# Patient Record
Sex: FEMALE | Race: WHITE | NOT HISPANIC OR LATINO
[De-identification: names, ages, dates, MRNs, and addresses within clinical notes are randomized per-mention and may not be internally consistent; named-entity substitution may affect disease eponyms.]

---

## 2017-05-19 ENCOUNTER — TRANSCRIPTION ENCOUNTER (OUTPATIENT)
Age: 62
End: 2017-05-19

## 2024-01-13 DIAGNOSIS — M25.561 PAIN IN RIGHT KNEE: ICD-10-CM

## 2024-01-13 DIAGNOSIS — M25.562 PAIN IN RIGHT KNEE: ICD-10-CM

## 2024-01-13 PROBLEM — Z00.00 ENCOUNTER FOR PREVENTIVE HEALTH EXAMINATION: Status: ACTIVE | Noted: 2024-01-13

## 2024-01-19 ENCOUNTER — NON-APPOINTMENT (OUTPATIENT)
Age: 69
End: 2024-01-19

## 2024-01-19 ENCOUNTER — APPOINTMENT (OUTPATIENT)
Dept: ORTHOPEDIC SURGERY | Facility: CLINIC | Age: 69
End: 2024-01-19
Payer: MEDICARE

## 2024-01-19 VITALS — HEIGHT: 64 IN | BODY MASS INDEX: 23.56 KG/M2 | WEIGHT: 138 LBS

## 2024-01-19 PROCEDURE — 99203 OFFICE O/P NEW LOW 30 MIN: CPT

## 2024-01-19 PROCEDURE — 73564 X-RAY EXAM KNEE 4 OR MORE: CPT | Mod: 50

## 2024-01-19 NOTE — HISTORY OF PRESENT ILLNESS
[de-identified] : NIXON SEGUNDO 68 year old female presents for initial evaluation of bilateral knee pain. She states the pain is worse in her left knee than in the right. She has had right knee pain for 9 years and it stopped for a while. However, she began to have bilateral knee pain in October which was insidious onset without injury. She notes more pain with deep bending along the medial knee and has been forced to change positions in yoga, as she has difficulty sitting with her legs crossed. Also, walking upstairs causes more pain than walking downstairs. She also notes medial aching at night, and the pain is dull in nature without any buckling, clicking, or loss of motion. She has no walking limits and occasionally wears a sleeve knee brace. The patient has access to Motrin, Tylenol, and Meloxicam but does not usually take them. She had an MRI back in 2015 and she is unsure if she has had any prior injections. She has also not had any recent physical therapy or any recent surgeries. Her past medical history consists of undiagnosed connective tissue disorder, thyroid cancer s/p resection with secondary hyperthyroidism, hypertension, hyperlipidemia, and hearing loss in her right ear. She sees her PCP and endocrinologist with no known drug allergies.

## 2024-01-19 NOTE — ADDENDUM
[FreeTextEntry1] : This note was written by Hector Gomez on 01/19/2024 acting as scribe for Dr. Abram Lyles M.D.   I, Dr. Abram Lyles, have read and attest that all the information, medical decision making and discharge instructions within are true and accurate.

## 2024-01-19 NOTE — DISCUSSION/SUMMARY
[de-identified] : Discussed at length the nature of the patient's condition. Their bilateral knee symptoms appear secondary to a possible degenerative medial meniscus tear and underlying degenerative arthritis especially of the patellofemoral joint. Suggested we obtain an MRI of the bilateral knees to rule out intra articular pathology. If MRI shows a torn meniscus, we will give thought to a surgical arthroscopy, which was discussed. When results are available, we will discuss further.  Patient can continue home exercises, Tylenol, weight loss and activities as tolerated. All questions were answered, understanding verbalized. Patient is in agreement with plan of treatment. Patient may follow up as needed.

## 2024-01-19 NOTE — PHYSICAL EXAM
[de-identified] : General appearance: well-nourished and hydrated, pleasant, alert and oriented x 3, cooperative. HEENT: Normocephalic, EOM intact, Nasal septum midline, Oral cavity clear, External auditory canal clear. Cardiovascular: no apparent abnormalities, no lower leg edema, no varicosities, pedal pulses are palpable. Lymphatics Lymph nodes: none palpated, Lymphedema: not present. Neurologic: sensation is normal, no muscle weakness in upper or lower extremities, patella tendon reflexes intact. Dermatologic no apparent skin lesions, moist, warm, no rash. Spine: cervical spine appears normal and moves freely, thoracic spine appears normal and moves freely, lumbosacral spine appears normal and moves freely, and slight lower back discomfort. Gait: nonantalgic.  Left knee Inspection: popliteal angle 0 degrees, negative Kenney test. Wounds: none. Alignment: normal. Palpation: medial tenderness along joint line.  ROM active (in degrees): 0-160 Ligamentous laxity: all ligaments appear stable, negative ant. drawer test, negative post. drawer test, stable to varus stress test, stable to valgus stress test. negative Lachman's test, negative pivot shift test Meniscal Test: negative McMurrays, negative Khushi. Patellofemoral Alignment Test: Q angle-, normal. Muscle Test: good quad strength. Leg examination: calf is soft and non-tender.  Right knee Inspection: popliteal angle 0 degrees, negative Kenney test. Wounds: none. Alignment: normal. Palpation: tender along proximal medial tibia ROM active (in degrees):, 0-160 with patellofemoral clicking.  Ligamentous laxity: all ligaments appear stable,, negative ant. drawer test, negative post. drawer test, stable to varus stress test, stable to valgus stress test. negative Lachman's test, negative pivot shift test Meniscal Test: negative McMurrays, negative Khushi. Patellofemoral Alignment Test: Q angle-, normal. Muscle Test: good quad strength. Leg examination: calf is soft and non-tender.  Left hip Inspection: No swelling or ecchymosis. Wounds: none. Palpation: non-tender. Stability: no instability. Strength: 5/5 all motor groups. ROM: no pain with FROM. Leg length: equal.  Right hip Inspection: No swelling or ecchymosis. Wounds: none. Palpation: non-tender. Stability: no instability. Strength: 5/5 all motor groups. ROM: no pain with FROM. Leg length: equal.  Left ankle Inspection: no erythema noted, no swelling noted. Palpation: no pain on palpation . ROM: FROM without crepitus. Muscle strength: 5/5. Stability: no instability noted.  Right ankle Inspection: no erythema noted, no swelling noted. ROM: FROM without crepitus. Palpation: no pain on palpation . Muscle strength: 5/5. Stability: no instability noted.  Left foot Inspection: color, texture and turgor are normal. ROM: full range of motion of all joints without pain or crepitus. Palpation: no tenderness. Stability: no instability noted.  Right foot Inspection: color, texture and turgor are normal. ROM: full range of motion of all joints without pain or crepitus. Palpation: no tenderness. Stability: no instability noted.  Left shoulder Inspection: no muscle asymmetry, no atrophy. Palpation: no tenderness noted, ACJ non-tender. ROM: full active ROM, full passive ROM. Strength testing): anterior deltoid, supraspinatus, infraspinatus, subscapularis all 5/5. Stability test: ant. apprehension negative, post. apprehension negative, relocation test negative. Impingement Test: negative NEER.  Right shoulder Inspection: no muscle asymmetry, no atrophy. Palpation: no tenderness noted, ACJ non-tender. ROM: full active ROM, full passive ROM. Strength testing): anterior deltoid, supraspinatus, infraspinatus, subscapularis all 5/5. Stability test: ant. apprehension negative, post. apprehension negative, relocation test negative. Impingement Test: negative NEER. Surgical Wounds: none.  Left elbow Inspection: negative swelling. Wounds: none. Palpation: non-tender. ROM: full ROM. Strength: 5/5 all groups. Stability: no instability. Mass: none.  Right elbow Inspection: negative swelling. Wounds: none. Palpation: non-tender. ROM: full ROM. Strength: 5/5 all groups. Stability: no instability. Mass: none.  Left wrist Inspection: negative swelling. Wound: none. Palpation (bone): no tenderness. ROM: full ROM. Strength: full , good.  Right wrist Inspection: negative swelling. Wound: none. Palpation (bone): no tenderness. ROM: full ROM. Strength: full , good.  Left hand Inspection: no skin changes, normal appearance. Wounds: none. Strength: full , able to make full fist. Sensation: light touch intact all fingers and thumb. Vascular: good capillary refill < 3 seconds, all fingers and thumb. Mass: none.   Right hand Inspection: no skin changes, normal appearance. Wounds: none. Strength: full , able to make full fist. Sensation: light touch intact all fingers and thumb. Vascular: good capillary refill < 3 seconds, all fingers and thumb. Mass: none. [de-identified] : Right knee x-rays, standing AP/Lateral and Merchant films, and 45-degree PA standing view, taken at the office today shows degenerative arthritis, slight medial joint space narrowing, small tractions of the patella, patella at appropriate height and central position, patellofemoral joint space narrowing, Kellgren and Alec grade 1 with patellofemoral arthritis.   Left knee x-rays, standing AP/Lateral and Merchant films, and 45-degree PA standing view, taken at the office today shows degenerative arthritis, slight medial joint space narrowing, small tractions of the patella, patella at appropriate height and central position, patellofemoral joint space narrowing, Kellgren and Alec grade 1 with patellofemoral arthritis.   MRI right knee taken 05/06/15: films brought in and reviewed, see attached report. I agree with radiologist report including patellofemoral arthritis.

## 2024-01-31 ENCOUNTER — APPOINTMENT (OUTPATIENT)
Dept: ORTHOPEDIC SURGERY | Facility: CLINIC | Age: 69
End: 2024-01-31
Payer: MEDICARE

## 2024-01-31 DIAGNOSIS — M17.0 BILATERAL PRIMARY OSTEOARTHRITIS OF KNEE: ICD-10-CM

## 2024-01-31 DIAGNOSIS — S83.241A OTHER TEAR OF MEDIAL MENISCUS, CURRENT INJURY, RIGHT KNEE, INITIAL ENCOUNTER: ICD-10-CM

## 2024-01-31 DIAGNOSIS — S83.242A OTHER TEAR OF MEDIAL MENISCUS, CURRENT INJURY, LEFT KNEE, INITIAL ENCOUNTER: ICD-10-CM

## 2024-01-31 PROCEDURE — G2012 BRIEF CHECK IN BY MD/QHP: CPT

## 2024-01-31 RX ORDER — LIDOCAINE 5% 700 MG/1
5 PATCH TOPICAL
Qty: 30 | Refills: 2 | Status: ACTIVE | COMMUNITY
Start: 2024-01-31 | End: 1900-01-01